# Patient Record
Sex: MALE | Race: WHITE | NOT HISPANIC OR LATINO | Employment: FULL TIME | URBAN - NONMETROPOLITAN AREA
[De-identification: names, ages, dates, MRNs, and addresses within clinical notes are randomized per-mention and may not be internally consistent; named-entity substitution may affect disease eponyms.]

---

## 2024-04-26 ENCOUNTER — OFFICE VISIT (OUTPATIENT)
Dept: FAMILY MEDICINE CLINIC | Facility: CLINIC | Age: 19
End: 2024-04-26
Payer: MEDICAID

## 2024-04-26 VITALS
BODY MASS INDEX: 29.73 KG/M2 | SYSTOLIC BLOOD PRESSURE: 100 MMHG | DIASTOLIC BLOOD PRESSURE: 70 MMHG | WEIGHT: 212.4 LBS | HEIGHT: 71 IN | OXYGEN SATURATION: 99 % | HEART RATE: 60 BPM

## 2024-04-26 DIAGNOSIS — F43.21 GRIEF: ICD-10-CM

## 2024-04-26 DIAGNOSIS — R10.84 GENERALIZED ABDOMINAL PAIN: ICD-10-CM

## 2024-04-26 DIAGNOSIS — R63.4 WEIGHT LOSS, UNINTENTIONAL: ICD-10-CM

## 2024-04-26 DIAGNOSIS — R11.2 NAUSEA AND VOMITING, UNSPECIFIED VOMITING TYPE: Primary | ICD-10-CM

## 2024-04-26 LAB
BILIRUB BLD-MCNC: ABNORMAL MG/DL
CLARITY, POC: CLEAR
COLOR UR: YELLOW
EXPIRATION DATE: ABNORMAL
GLUCOSE UR STRIP-MCNC: NEGATIVE MG/DL
KETONES UR QL: ABNORMAL
LEUKOCYTE EST, POC: NEGATIVE
Lab: ABNORMAL
NITRITE UR-MCNC: NEGATIVE MG/ML
PH UR: 5.5 [PH] (ref 5–8)
PROT UR STRIP-MCNC: ABNORMAL MG/DL
RBC # UR STRIP: NEGATIVE /UL
SP GR UR: 1.03 (ref 1–1.03)
UROBILINOGEN UR QL: ABNORMAL

## 2024-04-26 RX ORDER — OMEPRAZOLE 20 MG/1
20 CAPSULE, DELAYED RELEASE ORAL DAILY
Qty: 30 CAPSULE | Refills: 2 | Status: SHIPPED | OUTPATIENT
Start: 2024-04-26 | End: 2024-04-26 | Stop reason: SDUPTHER

## 2024-04-26 RX ORDER — OMEPRAZOLE 20 MG/1
20 CAPSULE, DELAYED RELEASE ORAL DAILY
Qty: 30 CAPSULE | Refills: 2 | Status: SHIPPED | OUTPATIENT
Start: 2024-04-26

## 2024-04-26 NOTE — PATIENT INSTRUCTIONS
Local Options for Counseling:  [facility]- [phone]  [facility]- [place] [facility]- [phone]  [facility]- [phone]  [facility]- [phone]

## 2024-04-26 NOTE — PROGRESS NOTES
New Patient Office Visit      Date: 2024   Patient Name: Arnulfo Rodney  : 2005   MRN: [id number]     Chief Complaint:    Chief Complaint   Patient presents with    Vomiting       History of Present Illness: Arnulfo Rodney is a 18 y.o. male with history of spectrum disorder accompanied by his mother who is here today to establish care.  He c/o nausea and vomiting off and on x 1-1.5 years.  He states that he has episodes where he is unable to keep anything down for days at a time, and then he can return to regular eating habits. At the time of the flareup, he cannot tolerate any solids and only some liquids.  He has not been able to associate it with any certain kinds of food.  He admits increased belching and bloating, but he denies any increased flatulence.  His mother agrees that she thinks stress may be a factor, but there was not a specific event about the time that it started.  It has been worse over the last 2 to 3 months, and his grandfather passed away at that time. He has lost about 30 lbs in the last 2-3 months. He has not tried taking any medications for his symptoms.     He was initially diagnosed with ADHD as a child, but he was later diagnosed with spectrum disorder.  He has not been seeing anyone and has not taken any medications in a long time.    Subjective      Review of Systems:   Review of Systems   Constitutional:  Positive for appetite change and unexpected weight loss. Negative for fatigue, fever and unexpected weight gain.   Gastrointestinal:  Positive for abdominal pain, nausea and vomiting. Negative for abdominal distention, blood in stool, constipation, diarrhea, GERD and indigestion.   Psychiatric/Behavioral:  Negative for sleep disturbance and stress.         Grief       Past Medical History:   Past Medical History:   Diagnosis Date    Autism spectrum disorder        Past Surgical History: History reviewed. No pertinent surgical history.    Family History:   Family History  "  Problem Relation Age of Onset    Diabetes Maternal Grandfather     Hypertension Maternal Grandfather     Heart disease Maternal Grandfather     Kidney disease Maternal Grandfather     Heart disease Paternal Grandfather     Cancer Maternal Aunt        Social History:   Social History     Socioeconomic History    Marital status: Single   Tobacco Use    Smoking status: Never    Smokeless tobacco: Never   Vaping Use    Vaping status: Every Day   Substance and Sexual Activity    Alcohol use: Never    Drug use: Never    Sexual activity: Defer       Medications:     Current Outpatient Medications:     omeprazole (priLOSEC) 20 MG capsule, Take 1 capsule by mouth Daily., Disp: 30 capsule, Rfl: 2    Allergies:   Allergies   Allergen Reactions    Penicillins Rash       Objective     Physical Exam:  Vital Signs:   Vitals:    04/26/24 0808   BP: 100/70   BP Location: Right arm   Patient Position: Sitting   Cuff Size: Large Adult   Pulse: 60   SpO2: 99%   Weight: 96.3 kg (212 lb 6.4 oz)   Height: 180.3 cm (71\")     Body mass index is 29.62 kg/m².    Physical Exam  Vitals and nursing note reviewed.   Constitutional:       General: He is not in acute distress.     Appearance: Normal appearance. He is not ill-appearing.   HENT:      Head: Normocephalic and atraumatic.   Eyes:      Extraocular Movements: Extraocular movements intact.      Conjunctiva/sclera: Conjunctivae normal.      Pupils: Pupils are equal, round, and reactive to light.   Cardiovascular:      Rate and Rhythm: Normal rate and regular rhythm.      Pulses: Normal pulses.      Heart sounds: Normal heart sounds.   Pulmonary:      Effort: Pulmonary effort is normal. No respiratory distress.      Breath sounds: Normal breath sounds.   Abdominal:      General: Bowel sounds are normal. There is no distension.      Palpations: Abdomen is soft. There is no mass.      Tenderness: There is abdominal tenderness (Mild lower abdominal tenderness). There is no right CVA " tenderness, left CVA tenderness, guarding or rebound.      Hernia: No hernia is present.   Musculoskeletal:      Right lower leg: No edema.      Left lower leg: No edema.   Skin:     General: Skin is warm and dry.   Neurological:      General: No focal deficit present.      Mental Status: He is alert and oriented to person, place, and time.      Motor: No weakness.      Coordination: Coordination normal.   Psychiatric:         Mood and Affect: Mood normal.         Behavior: Behavior normal.       Results:   PHQ-9 Total Score: 0        Labs:    Recent Results (from the past 24 hour(s))   POC Urinalysis Dipstick, Automated    Collection Time: 04/26/24  9:20 AM    Specimen: Urine   Result Value Ref Range    Color Yellow Yellow, Straw, Dark Yellow, Elaine    Clarity, UA Clear Clear    Specific Gravity  1.030 1.005 - 1.030    pH, Urine 5.5 5.0 - 8.0    Leukocytes Negative Negative    Nitrite, UA Negative Negative    Protein,  mg/dL (A) Negative mg/dL    Glucose, UA Negative Negative mg/dL    Ketones, UA 15 mg/dL (A) Negative    Urobilinogen, UA 2.0 E.U./dL (A) Normal, 0.2 E.U./dL    Bilirubin Large (3+) (A) Negative    Blood, UA Negative Negative    Lot Number [id number]     Expiration Date 08/31/2024         Assessment / Plan      Assessment/Plan:   Diagnoses and all orders for this visit:    1. Nausea and vomiting, unspecified vomiting type (Primary)  Assessment & Plan:  His nausea and vomiting are current over the last 12 to 18 months.  I do believe that stress is a likely factor, but we will also check blood work for further evaluation.  I recommend doing an abdominal ultrasound to evaluate further.  We will consider pending results of blood work.  I will also start him on a PPI to help reduce inflammation.      Orders:  -     CBC Auto Differential; Future  -     Comprehensive Metabolic Panel; Future  -     Lipid Panel; Future  -     Hemoglobin A1c; Future  -     Thyroid Cascade Profile; Future  -     Amylase;  Future  -     Lipase; Future  -     omeprazole (priLOSEC) 20 MG capsule; Take 1 capsule by mouth Daily.  Dispense: 30 capsule; Refill: 2  -     CBC Auto Differential  -     Comprehensive Metabolic Panel  -     Lipid Panel  -     Hemoglobin A1c  -     Thyroid Cascade Profile  -     Amylase  -     Lipase    2. Generalized abdominal pain  Assessment & Plan:  His abdominal pain is recurrent.  His UA did not show any infection or hematuria, but he does show signs of dehydration, proteinuria, and bilirubin.  We will check his blood work, and I think we should consider abdominal ultrasound for further evaluation.    Orders:  -     POC Urinalysis Dipstick, Automated  -     CBC Auto Differential; Future  -     Comprehensive Metabolic Panel; Future  -     Lipid Panel; Future  -     Hemoglobin A1c; Future  -     Thyroid Cascade Profile; Future  -     Amylase; Future  -     Lipase; Future  -     omeprazole (priLOSEC) 20 MG capsule; Take 1 capsule by mouth Daily.  Dispense: 30 capsule; Refill: 2  -     CBC Auto Differential  -     Comprehensive Metabolic Panel  -     Lipid Panel  -     Hemoglobin A1c  -     Thyroid Cascade Profile  -     Amylase  -     Lipase    3. Weight loss, unintentional  Assessment & Plan:  His weight loss appears to be a combination of his symptoms and grieving.  We will check blood work and imaging and reevaluate on next visit.      4. Grief  Assessment & Plan:  Grief appears to be a factor in his weight changes and may be a contributing factor to his symptoms.  We discussed the benefits of counseling, and I gave him information on local services in the [place].         Follow Up:   Return for recheck 4-8 weeks.    Varsha Bernabe PA-C  [facility]

## 2024-04-26 NOTE — ASSESSMENT & PLAN NOTE
His nausea and vomiting are current over the last 12 to 18 months.  I do believe that stress is a likely factor, but we will also check blood work for further evaluation.  I recommend doing an abdominal ultrasound to evaluate further.  We will consider pending results of blood work.  I will also start him on a PPI to help reduce inflammation.

## 2024-04-26 NOTE — ASSESSMENT & PLAN NOTE
His weight loss appears to be a combination of his symptoms and grieving.  We will check blood work and imaging and reevaluate on next visit.

## 2024-04-26 NOTE — ASSESSMENT & PLAN NOTE
Grief appears to be a factor in his weight changes and may be a contributing factor to his symptoms.  We discussed the benefits of counseling, and I gave him information on local services in the [place].

## 2024-04-26 NOTE — ASSESSMENT & PLAN NOTE
His abdominal pain is recurrent.  His UA did not show any infection or hematuria, but he does show signs of dehydration, proteinuria, and bilirubin.  We will check his blood work, and I think we should consider abdominal ultrasound for further evaluation.

## 2024-04-27 LAB
ALBUMIN SERPL-MCNC: 4.9 G/DL (ref 4.3–5.2)
ALBUMIN/GLOB SERPL: 1.9 {RATIO} (ref 1.2–2.2)
ALP SERPL-CCNC: 100 IU/L (ref 51–125)
ALT SERPL-CCNC: 14 IU/L (ref 0–44)
AMYLASE SERPL-CCNC: 64 U/L (ref 31–110)
AST SERPL-CCNC: 19 IU/L (ref 0–40)
BASOPHILS # BLD AUTO: 0.1 X10E3/UL (ref 0–0.2)
BASOPHILS NFR BLD AUTO: 2 %
BILIRUB SERPL-MCNC: 0.6 MG/DL (ref 0–1.2)
BUN SERPL-MCNC: 13 MG/DL (ref 6–20)
BUN/CREAT SERPL: 11 (ref 9–20)
CALCIUM SERPL-MCNC: 9.9 MG/DL (ref 8.7–10.2)
CHLORIDE SERPL-SCNC: 98 MMOL/L (ref 96–106)
CHOLEST SERPL-MCNC: 164 MG/DL (ref 100–199)
CO2 SERPL-SCNC: 23 MMOL/L (ref 20–29)
CREAT SERPL-MCNC: 1.2 MG/DL (ref 0.76–1.27)
EGFRCR SERPLBLD CKD-EPI 2021: 89 ML/MIN/1.73
EOSINOPHIL # BLD AUTO: 0.2 X10E3/UL (ref 0–0.4)
EOSINOPHIL NFR BLD AUTO: 4 %
ERYTHROCYTE [DISTWIDTH] IN BLOOD BY AUTOMATED COUNT: 12.6 % (ref 11.6–15.4)
GLOBULIN SER CALC-MCNC: 2.6 G/DL (ref 1.5–4.5)
GLUCOSE SERPL-MCNC: 87 MG/DL (ref 70–99)
HBA1C MFR BLD: 5 % (ref 4.8–5.6)
HCT VFR BLD AUTO: 50.4 % (ref 37.5–51)
HDLC SERPL-MCNC: 35 MG/DL
HGB BLD-MCNC: 16.7 G/DL (ref 13–17.7)
IMM GRANULOCYTES # BLD AUTO: 0 X10E3/UL (ref 0–0.1)
IMM GRANULOCYTES NFR BLD AUTO: 0 %
LDLC SERPL CALC-MCNC: 115 MG/DL (ref 0–99)
LIPASE SERPL-CCNC: 18 U/L (ref 13–78)
LYMPHOCYTES # BLD AUTO: 1.4 X10E3/UL (ref 0.7–3.1)
LYMPHOCYTES NFR BLD AUTO: 24 %
MCH RBC QN AUTO: 28.2 PG (ref 26.6–33)
MCHC RBC AUTO-ENTMCNC: 33.1 G/DL (ref 31.5–35.7)
MCV RBC AUTO: 85 FL (ref 79–97)
MONOCYTES # BLD AUTO: 0.5 X10E3/UL (ref 0.1–0.9)
MONOCYTES NFR BLD AUTO: 8 %
NEUTROPHILS # BLD AUTO: 3.7 X10E3/UL (ref 1.4–7)
NEUTROPHILS NFR BLD AUTO: 62 %
PLATELET # BLD AUTO: 281 X10E3/UL (ref 150–450)
POTASSIUM SERPL-SCNC: 3.9 MMOL/L (ref 3.5–5.2)
PROT SERPL-MCNC: 7.5 G/DL (ref 6–8.5)
RBC # BLD AUTO: 5.93 X10E6/UL (ref 4.14–5.8)
SODIUM SERPL-SCNC: 140 MMOL/L (ref 134–144)
TRIGL SERPL-MCNC: 72 MG/DL (ref 0–149)
TSH SERPL DL<=0.005 MIU/L-ACNC: 2.43 UIU/ML (ref 0.45–4.5)
VLDLC SERPL CALC-MCNC: 14 MG/DL (ref 5–40)
WBC # BLD AUTO: 5.9 X10E3/UL (ref 3.4–10.8)

## 2024-06-05 ENCOUNTER — OFFICE VISIT (OUTPATIENT)
Dept: FAMILY MEDICINE CLINIC | Facility: CLINIC | Age: 19
End: 2024-06-05

## 2024-06-05 VITALS
DIASTOLIC BLOOD PRESSURE: 78 MMHG | BODY MASS INDEX: 31.67 KG/M2 | WEIGHT: 226.2 LBS | SYSTOLIC BLOOD PRESSURE: 112 MMHG | OXYGEN SATURATION: 99 % | HEART RATE: 69 BPM | HEIGHT: 71 IN

## 2024-06-05 DIAGNOSIS — E78.5 ELEVATED LIPIDS: ICD-10-CM

## 2024-06-05 DIAGNOSIS — R11.2 NAUSEA AND VOMITING, UNSPECIFIED VOMITING TYPE: ICD-10-CM

## 2024-06-05 DIAGNOSIS — R10.84 GENERALIZED ABDOMINAL PAIN: ICD-10-CM

## 2024-06-05 RX ORDER — OMEPRAZOLE 40 MG/1
40 CAPSULE, DELAYED RELEASE ORAL DAILY
Qty: 30 CAPSULE | Refills: 1 | Status: SHIPPED | OUTPATIENT
Start: 2024-06-05

## 2024-06-05 NOTE — ASSESSMENT & PLAN NOTE
Cholesterol was elevated on recent labs.  I encouraged diet low in saturated fat and increased exercise to help with this.  We will continue to monitor.

## 2024-06-05 NOTE — PROGRESS NOTES
Office Note     Name: Arnulfo Rodney    : 2005     MRN: [id number]     Chief Complaint  Nausea and Vomiting    Subjective     History of Present Illness:  Arnulfo Rodney is a 18 y.o. male accompanied by his mother who presents today for eval of nausea, vomiting, and abdominal pain.  He states that the omeprazole has been helping him with his symptoms, and he has been able to tolerate food and fluids.  He states that he has still had some nausea and has vomited a couple of times.  He states that the vomiting was when he had missed a dose of his medication.  He has gained back 14 pounds from his 2024 visit.      Review of Systems:   Review of Systems   All other systems reviewed and are negative.      Past Medical History:   Past Medical History:   Diagnosis Date    Autism spectrum disorder        Past Surgical History: History reviewed. No pertinent surgical history.    Family History:   Family History   Problem Relation Age of Onset    Diabetes Maternal Grandfather     Hypertension Maternal Grandfather     Heart disease Maternal Grandfather     Kidney disease Maternal Grandfather     Heart disease Paternal Grandfather     Cancer Maternal Aunt        Social History:   Social History     Socioeconomic History    Marital status: Single   Tobacco Use    Smoking status: Never    Smokeless tobacco: Never   Vaping Use    Vaping status: Every Day    Substances: Nicotine    Devices: Disposable   Substance and Sexual Activity    Alcohol use: Never    Drug use: Never    Sexual activity: Defer       Immunizations:   Immunization History   Administered Date(s) Administered    DTaP / IPV 2009    DTaP, Unspecified 2003, 2003, 2004, 12/10/2004    Hep B / HiB 2003, 2004    Hib (PRP-OMP) 2003    IPV 2003, 2003, 2004    MMR 2004, 2009    PEDS-Pneumococcal Conjugate (PCV7) 2003, 2003, 2004, 12/10/2004    Varicella 2004,  "07/28/2009        Medications:     Current Outpatient Medications:     omeprazole (priLOSEC) 40 MG capsule, Take 1 capsule by mouth Daily. After 2 months completed, then I recommend tapering down to the 20 mg., Disp: 30 capsule, Rfl: 1    Allergies:   Allergies   Allergen Reactions    Penicillins Rash       Objective     Vital Signs  /78 (BP Location: Left arm, Patient Position: Sitting)   Pulse 69   Ht 180.3 cm (71\")   Wt 103 kg (226 lb 3.2 oz)   SpO2 99%   BMI 31.55 kg/m²   Estimated body mass index is 31.55 kg/m² as calculated from the following:    Height as of this encounter: 180.3 cm (71\").    Weight as of this encounter: 103 kg (226 lb 3.2 oz).      Physical Exam  Vitals and nursing note reviewed.   Constitutional:       General: He is not in acute distress.     Appearance: Normal appearance. He is not ill-appearing.   HENT:      Head: Normocephalic and atraumatic.   Cardiovascular:      Rate and Rhythm: Normal rate and regular rhythm.      Pulses: Normal pulses.      Heart sounds: Normal heart sounds.   Pulmonary:      Effort: Pulmonary effort is normal. No respiratory distress.      Breath sounds: Normal breath sounds.   Musculoskeletal:      Right lower leg: No edema.      Left lower leg: No edema.   Skin:     General: Skin is warm and dry.   Neurological:      General: No focal deficit present.      Mental Status: He is alert and oriented to person, place, and time.      Motor: No weakness.      Coordination: Coordination normal.   Psychiatric:         Mood and Affect: Mood normal.         Behavior: Behavior normal.        Results:  Discussed results of recent labs.  Recent Results    CBC Auto Differential    Collection Time: 04/26/24  9:16 AM    Specimen: Arm, Right; Blood   Result Value Ref Range    WBC 5.9 3.4 - 10.8 x10E3/uL    RBC 5.93 (H) 4.14 - 5.80 x10E6/uL    Hemoglobin 16.7 13.0 - 17.7 g/dL    Hematocrit 50.4 37.5 - 51.0 %    MCV 85 79 - 97 fL    MCH 28.2 26.6 - 33.0 pg    MCHC 33.1 " 31.5 - 35.7 g/dL    RDW 12.6 11.6 - 15.4 %    Platelets 281 150 - 450 x10E3/uL    Neutrophil Rel % 62 Not Estab. %    Lymphocyte Rel % 24 Not Estab. %    Monocyte Rel % 8 Not Estab. %    Eosinophil Rel % 4 Not Estab. %    Basophil Rel % 2 Not Estab. %    Neutrophils Absolute 3.7 1.4 - 7.0 x10E3/uL    Lymphocytes Absolute 1.4 0.7 - 3.1 x10E3/uL    Monocytes Absolute 0.5 0.1 - 0.9 x10E3/uL    Eosinophils Absolute 0.2 0.0 - 0.4 x10E3/uL    Basophils Absolute 0.1 0.0 - 0.2 x10E3/uL    Immature Granulocyte Rel % 0 Not Estab. %    Immature Grans Absolute 0.0 0.0 - 0.1 x10E3/uL   Comprehensive Metabolic Panel    Collection Time: 04/26/24  9:16 AM    Specimen: Arm, Right; Blood   Result Value Ref Range    Glucose 87 70 - 99 mg/dL    BUN 13 6 - 20 mg/dL    Creatinine 1.20 0.76 - 1.27 mg/dL    EGFR Result 89 >59 mL/min/1.73    BUN/Creatinine Ratio 11 9 - 20    Sodium 140 134 - 144 mmol/L    Potassium 3.9 3.5 - 5.2 mmol/L    Chloride 98 96 - 106 mmol/L    Total CO2 23 20 - 29 mmol/L    Calcium 9.9 8.7 - 10.2 mg/dL    Total Protein 7.5 6.0 - 8.5 g/dL    Albumin 4.9 4.3 - 5.2 g/dL    Globulin 2.6 1.5 - 4.5 g/dL    A/G Ratio 1.9 1.2 - 2.2    Total Bilirubin 0.6 0.0 - 1.2 mg/dL    Alkaline Phosphatase 100 51 - 125 IU/L    AST (SGOT) 19 0 - 40 IU/L    ALT (SGPT) 14 0 - 44 IU/L   Lipid Panel    Collection Time: 04/26/24  9:16 AM    Specimen: Arm, Right; Blood   Result Value Ref Range    Total Cholesterol 164 100 - 199 mg/dL    Triglycerides 72 0 - 149 mg/dL    HDL Cholesterol 35 (L) >39 mg/dL    VLDL Cholesterol [place] 14 5 - 40 mg/dL    LDL Chol Calc (NIH) 115 (H) 0 - 99 mg/dL   Hemoglobin A1c    Collection Time: 04/26/24  9:16 AM    Specimen: Arm, Right; Blood   Result Value Ref Range    Hemoglobin A1C 5.0 4.8 - 5.6 %   Thyroid Cascade Profile    Collection Time: 04/26/24  9:16 AM    Specimen: Arm, Right; Blood   Result Value Ref Range    TSH 2.430 0.450 - 4.500 uIU/mL   Amylase    Collection Time: 04/26/24  9:16 AM    Specimen: Arm,  Right; Blood   Result Value Ref Range    Amylase 64 31 - 110 U/L   Lipase    Collection Time: 04/26/24  9:16 AM    Specimen: Arm, Right; Blood   Result Value Ref Range    Lipase 18 13 - 78 U/L   POC Urinalysis Dipstick, Automated    Collection Time: 04/26/24  9:20 AM    Specimen: Urine   Result Value Ref Range    Color Yellow Yellow, Straw, Dark Yellow, Elaine    Clarity, UA Clear Clear    Specific Gravity  1.030 1.005 - 1.030    pH, Urine 5.5 5.0 - 8.0    Leukocytes Negative Negative    Nitrite, UA Negative Negative    Protein,  mg/dL (A) Negative mg/dL    Glucose, UA Negative Negative mg/dL    Ketones, UA 15 mg/dL (A) Negative    Urobilinogen, UA 2.0 E.U./dL (A) Normal, 0.2 E.U./dL    Bilirubin Large (3+) (A) Negative    Blood, UA Negative Negative    Lot Number [id number]     Expiration Date 08/31/2024         Assessment and Plan     Assessment/Plan:  Diagnoses and all orders for this visit:    1. Nausea and vomiting, unspecified vomiting type  Assessment & Plan:  His nausea and vomiting have improved with the omeprazole, but they have not completely resolved.  He states that he can tell when he misses a dose because the vomiting is back.  I will increase his dose to 40 mg for the next 2 months, and then he will try to taper back to the 20mg.  We will reevaluate in 3 months.    Orders:  -     omeprazole (priLOSEC) 40 MG capsule; Take 1 capsule by mouth Daily. After 2 months completed, then I recommend tapering down to the 20 mg.  Dispense: 30 capsule; Refill: 1    2. Generalized abdominal pain  Assessment & Plan:  Resolved with omeprazole.    Orders:  -     omeprazole (priLOSEC) 40 MG capsule; Take 1 capsule by mouth Daily. After 2 months completed, then I recommend tapering down to the 20 mg.  Dispense: 30 capsule; Refill: 1    3. Elevated lipids  Assessment & Plan:  Cholesterol was elevated on recent labs.  I encouraged diet low in saturated fat and increased exercise to help with this.  We will continue to  monitor.          Follow Up  Return in about 3 months (around 9/5/2024) for Annual Physical.    Varsha Bernabe PA-C  [facility] Internal Medicine [facility]

## 2024-06-05 NOTE — ASSESSMENT & PLAN NOTE
His nausea and vomiting have improved with the omeprazole, but they have not completely resolved.  He states that he can tell when he misses a dose because the vomiting is back.  I will increase his dose to 40 mg for the next 2 months, and then he will try to taper back to the 20mg.  We will reevaluate in 3 months.